# Patient Record
Sex: MALE | Race: WHITE | NOT HISPANIC OR LATINO | Employment: FULL TIME | URBAN - NONMETROPOLITAN AREA
[De-identification: names, ages, dates, MRNs, and addresses within clinical notes are randomized per-mention and may not be internally consistent; named-entity substitution may affect disease eponyms.]

---

## 2024-03-28 ENCOUNTER — APPOINTMENT (OUTPATIENT)
Dept: PRIMARY CARE | Facility: CLINIC | Age: 54
End: 2024-03-28
Payer: COMMERCIAL

## 2024-04-15 ENCOUNTER — OFFICE VISIT (OUTPATIENT)
Dept: OTOLARYNGOLOGY | Facility: CLINIC | Age: 54
End: 2024-04-15
Payer: COMMERCIAL

## 2024-04-15 VITALS — WEIGHT: 162 LBS | HEIGHT: 69 IN | BODY MASS INDEX: 23.99 KG/M2

## 2024-04-15 DIAGNOSIS — R09.81 NASAL CONGESTION: ICD-10-CM

## 2024-04-15 DIAGNOSIS — G50.1 ATYPICAL FACE PAIN: Primary | ICD-10-CM

## 2024-04-15 DIAGNOSIS — F17.200 SMOKER: ICD-10-CM

## 2024-04-15 PROCEDURE — 1036F TOBACCO NON-USER: CPT | Performed by: OTOLARYNGOLOGY

## 2024-04-15 PROCEDURE — 31231 NASAL ENDOSCOPY DX: CPT | Performed by: OTOLARYNGOLOGY

## 2024-04-15 PROCEDURE — 99203 OFFICE O/P NEW LOW 30 MIN: CPT | Performed by: OTOLARYNGOLOGY

## 2024-04-15 NOTE — PROGRESS NOTES
"Chief Complaint   Patient presents with    New Patient Visit     NP- SINUS ISSUES     HPI:  Ruben Nobles is a 54 y.o. male he presents today in referral from his neurologist for evaluation of his sinuses.  About 3 weeks ago he began to have left-sided back pain which radiated up to his left shoulder left side of his neck left side of his head and face.  This would occur about 2-3 times a day lasting about 15 to 20 minutes.  Because of that he did go to the emergency department where he had CT imaging of the brain which was normal.  Sinuses were clear on this.  MRI showed no brain issues but did show some moderate mucosal thickening of the posterior ethmoids as well as some mild anterior ethmoid and left frontal and maxillary mucosal thickening.  There is air-fluid level in the right maxillary sinus.  Few weeks prior to these images he did have a significant cold and upper respiratory infection which resolved.  Currently feels better without any upper respiratory symptoms.  He has no facial pressure or pain, congestion, or drainage.  He has been treated with pain medication and muscle relaxant and his back pain and headaches have resolved as well  He is a daily smoker    PMH:  Past Medical History:   Diagnosis Date    Essential (primary) hypertension     Benign hypertension     History reviewed. No pertinent surgical history.      Medications:   No current outpatient medications on file.     Allergies:  No Known Allergies     ROS:  Review of systems normal unless stated otherwise in the HPI and/or PMH.    Physical Exam:  Height 1.753 m (5' 9\"), weight 73.5 kg (162 lb). Body mass index is 23.92 kg/m².     GENERAL APPEARANCE: Well developed and well nourished.  Alert and oriented in no acute distress.  Normal vocal quality.      HEAD/FACE: No erythema or edema or facial tenderness.  Normal facial nerve function bilaterally.    EAR:       EXTERNAL: Normal pinnas and external auditory canals without lesion or " obstructing wax.       MIDDLE EAR: Tympanic membranes intact and mobile with normal landmarks.  Middle ear space appears well aerated.       TUBE STATUS: N/A       MASTOID CAVITY: N/A       HEARING: Gross hearing assessment is within normal limits.      NOSE:       VISUALIZED USING: Anterior rhinoscopy with headlight and nasal speculum.  Flexible scoping performed       DORSUM: Midline, nontraumatic appearance.       MUCOSA: Normal-appearing.       SECRETIONS: Normal.       SEPTUM: Midline and nonobstructing.       INFERIOR TURBINATES: Normal.       MIDDLE TURBINATES/MEATUS: Normal.  No evidence of any purulence or polyps.       BLEEDING: N/A         ORAL CAVITY/PHARYNX:       TEETH: Adequate dentition.       TONGUE: No mass or lesion.  Normal mobility.       FLOOR OF MOUTH: No mass or lesion.       PALATE: Normal hard palate, soft palate, and uvula.       OROPHARYNX: Normal without mass or lesion.       BUCCAL MUCOSA/GBS: Normal without mass or lesion.       LIPS: Normal.    LARYNX/HYPOPHARYNX/NASOPHARYNX: Flexible laryngoscopy was performed after consent secondary to an inadequate mirror examination.  The flexible laryngoscope was placed through the nasal cavity revealing normal nasopharynx, normal oropharynx, normal hypopharynx, and normal larynx.  There is normal bilateral vocal cord mobility without any mucosal masses or lesions.    NECK: No palpable masses or abnormal adenopathy.  Trachea is midline.    THYROID: No thyromegaly or palpable nodule.    SALIVARY GLANDS: Normal bilateral parotid and submandibular glands by inspection and palpation.    TMJ's: Normal.    NEURO: Cranial nerve exam grossly normal bilaterally.       Assessment/Plan   Ruben was seen today for new patient visit.  Diagnoses and all orders for this visit:  Atypical face pain (Primary)  Nasal congestion  Smoker     Reassured.  I do not believe his MRI findings represent any acute bacterial rhinosinusitis but more chronic issues related to  his chronic congestion and smoking.  His recent upper respiratory infection prior to imaging may contribute as well.  I also do not believe that his sinuses are contributing at all to his left back pain or headaches.  He has had some improvement with this with treatment for his neurologist which she will continue and I do recommend he continue to follow-up with them.  From a general sinus standpoint recommend he quit smoking, use a [place] pot daily and consider Flonase as well.  Call or follow-up with any sinus changes or complaints  Follow up if symptoms worsen or fail to improve.     Topher Johnson MD

## 2024-07-24 ENCOUNTER — APPOINTMENT (OUTPATIENT)
Dept: OTOLARYNGOLOGY | Facility: CLINIC | Age: 54
End: 2024-07-24
Payer: COMMERCIAL